# Patient Record
Sex: FEMALE | URBAN - METROPOLITAN AREA
[De-identification: names, ages, dates, MRNs, and addresses within clinical notes are randomized per-mention and may not be internally consistent; named-entity substitution may affect disease eponyms.]

---

## 2018-03-30 ENCOUNTER — IMPORTED ENCOUNTER (OUTPATIENT)
Dept: URBAN - METROPOLITAN AREA CLINIC 9 | Facility: CLINIC | Age: 73
End: 2018-03-30

## 2018-04-11 ENCOUNTER — IMPORTED ENCOUNTER (OUTPATIENT)
Dept: URBAN - METROPOLITAN AREA CLINIC 9 | Facility: CLINIC | Age: 73
End: 2018-04-11

## 2018-04-18 ENCOUNTER — IMPORTED ENCOUNTER (OUTPATIENT)
Dept: URBAN - METROPOLITAN AREA CLINIC 9 | Facility: CLINIC | Age: 73
End: 2018-04-18

## 2018-10-30 NOTE — PATIENT DISCUSSION
The patient was informed that with the Basic option, they will most likely need prescription glasses at all focal points after surgery. The patient elects Basic OS, goal of emmetropia.

## 2018-11-20 NOTE — PATIENT DISCUSSION
Cataract surgery has been performed in the first eye and activities of daily living are still impaired. The patient would like to proceed with cataract surgery in the second eye as scheduled. The patient elects Basic OD, goal of nely.

## 2019-01-25 NOTE — PROCEDURE NOTE: CLINICAL
PROCEDURE NOTE: Intravitreal Kenalog OS. Diagnosis: Mild Cystoid Macular Edema. Prior to injection, risks/benefits/alternatives discussed including infection, loss of vision, hemorrhage, cataract, glaucoma, retinal tears or detachment and patient wished to proceed. Betadine prep was performed. Intravitreal injection of Kenalog 2.0 mg/0.05 ml was given. Lot #: null. Expiration Date: . EXP. Vial # *. Inventory Id: null. Injection site: 3-4 mm from the limbus. Patient tolerated procedure well. There were no complications. Post injection instructions given. Patient given office phone number/answering service number and advised to call immediately should there be an increase in floaters or redness, loss of vision or pain, or should they have any other questions or concerns. Bella Ibarra

## 2019-01-25 NOTE — PATIENT DISCUSSION
Recommended Kenalog injection today. The injection was given and tolerated well by patient. Post-injection instructions were reviewed and understood by the patient.  Patient to continue Pred, use q 2hrs.  Patient to return in 6 weeks.

## 2019-01-25 NOTE — PATIENT DISCUSSION
Discussed the importance of blood pressure control in the prevention of ocular complications.  Patient to continue Pred, taper-tid 2 weeks, bid times 2 weeks, qday times 2 weeks then stop.

## 2019-03-05 NOTE — PATIENT DISCUSSION
Decreased mild CME today, improved. Will taper PRED qid for two weeks, tid for two weeks, bid for two weeks, qd for two weeks, then stop. Will see pt back in 2 months.

## 2021-10-16 ASSESSMENT — TONOMETRY
OS_IOP_MMHG: 12
OS_IOP_MMHG: 23
OD_IOP_MMHG: 21
OD_IOP_MMHG: 12
OD_IOP_MMHG: 20
OS_IOP_MMHG: 21

## 2021-10-16 ASSESSMENT — VISUAL ACUITY
OD_SC: 20/20 SN
OD_SC: 20/30 SN
OD_CC: 20/30 SN
OS_SC: 20/100 SN
OS_CC: 20/30 SN
OS_SC: 20/400 SN
OS_SC: 20/200 SN
OD_SC: 20/200 SN